# Patient Record
Sex: MALE | Race: WHITE | URBAN - METROPOLITAN AREA
[De-identification: names, ages, dates, MRNs, and addresses within clinical notes are randomized per-mention and may not be internally consistent; named-entity substitution may affect disease eponyms.]

---

## 2020-05-09 ENCOUNTER — EMERGENCY (EMERGENCY)
Facility: HOSPITAL | Age: 75
LOS: 0 days | Discharge: HOME | End: 2020-05-09
Attending: EMERGENCY MEDICINE
Payer: MEDICARE

## 2020-05-09 VITALS
RESPIRATION RATE: 18 BRPM | TEMPERATURE: 99 F | SYSTOLIC BLOOD PRESSURE: 154 MMHG | HEART RATE: 67 BPM | DIASTOLIC BLOOD PRESSURE: 72 MMHG | OXYGEN SATURATION: 97 %

## 2020-05-09 VITALS — WEIGHT: 145.06 LBS | HEIGHT: 66 IN
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
## 2020-05-09 DIAGNOSIS — S92.002A UNSPECIFIED FRACTURE OF LEFT CALCANEUS, INITIAL ENCOUNTER FOR CLOSED FRACTURE: ICD-10-CM

## 2020-05-09 DIAGNOSIS — Z88.5 ALLERGY STATUS TO NARCOTIC AGENT: ICD-10-CM
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
## 2020-05-09 DIAGNOSIS — R60.0 LOCALIZED EDEMA: ICD-10-CM
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
## 2020-05-09 DIAGNOSIS — M25.572 PAIN IN LEFT ANKLE AND JOINTS OF LEFT FOOT: ICD-10-CM

## 2020-05-09 PROCEDURE — 73700 CT LOWER EXTREMITY W/O DYE: CPT | Mod: 26,LT

## 2020-05-09 PROCEDURE — 73630 X-RAY EXAM OF FOOT: CPT | Mod: 26,LT

## 2020-05-09 PROCEDURE — 76376 3D RENDER W/INTRP POSTPROCES: CPT

## 2020-05-09 PROCEDURE — 76376 3D RENDER W/INTRP POSTPROCES: CPT | Mod: 26

## 2020-05-09 PROCEDURE — 73610 X-RAY EXAM OF ANKLE: CPT | Mod: 26,LT

## 2020-05-09 PROCEDURE — 99284 EMERGENCY DEPT VISIT MOD MDM: CPT

## 2020-05-09 PROCEDURE — 73700 CT LOWER EXTREMITY W/O DYE: CPT | Mod: LT

## 2020-05-09 PROCEDURE — 73630 X-RAY EXAM OF FOOT: CPT | Mod: LT

## 2020-05-09 PROCEDURE — 99284 EMERGENCY DEPT VISIT MOD MDM: CPT | Mod: 25

## 2020-05-09 PROCEDURE — 29515 APPLICATION SHORT LEG SPLINT: CPT | Mod: LT

## 2020-05-09 PROCEDURE — 29515 APPLICATION SHORT LEG SPLINT: CPT

## 2020-05-09 PROCEDURE — 73610 X-RAY EXAM OF ANKLE: CPT | Mod: LT

## 2020-05-09 NOTE — ED ADULT NURSE NOTE - NSIMPLEMENTINTERV_GEN_ALL_ED
Implemented All Universal Safety Interventions:  [place] to call system. Call bell, personal items and telephone within reach. Instruct patient to call for assistance. Room bathroom lighting operational. Non-slip footwear when patient is off stretcher. Physically safe environment: no spills, clutter or unnecessary equipment. Stretcher in lowest position, wheels locked, appropriate side rails in place.

## 2020-05-09 NOTE — ED ADULT TRIAGE NOTE - CCCP TRG CHIEF CMPLNT
Patient presents with fish hook in finger about 20 minutes PTA. Bleeding controlled.   
ankle pain/injury

## 2020-05-09 NOTE — ED ADULT NURSE NOTE - OBJECTIVE STATEMENT
Patient present sot ED alert and oriented x3. s/p fall this mourning. Patient was walking down stair and "missed a step". Patient took a fall and dneies hitting head, LOC. Skin is intact. Moderate (2+) swelling to right ankle/foot.  pulses normal 2+. Patient present sot ED alert and oriented x3. s/p fall this mourning. Patient was walking down stair and "missed a step". Patient took a fall and dneies hitting head, LOC. Skin is intact. Moderate (2+) swelling left ankle/foot.  pulses normal 2+.

## 2020-05-09 NOTE — ED PROVIDER NOTE - NS_ ATTENDINGSCRIBEDETAILS _ED_A_ED_FT
I, Geovanny Viveros MD,  performed the initial face to face bedside interview with this patient regarding history of present illness, review of symptoms and relevant past medical, social and family history.  I completed an independent physical examination.    The history, relevant review of systems, past medical and surgical history, medical decision making, and physical examination was documented by the scribe in my presence and I attest to the accuracy of the documentation.

## 2020-05-09 NOTE — ED PROVIDER NOTE - NSFOLLOWUPINSTRUCTIONS_ED_ALL_ED_FT
Sprain    WHAT YOU NEED TO KNOW:    A sprain happens when a ligament is stretched or torn. Ligaments are tough tissues that connect bones. Ligaments support your joints and keep your bones in place. They allow you to lift, lower, or rotate your arms and legs. A sprain may involve one or more ligaments.     DISCHARGE INSTRUCTIONS:    Return to the emergency department if:     You have numbness or tingling below the injury, such as in your fingers or toes.      The skin over your sprained area is blue or pale.       Your pain has increased or returned, even after you take pain medicine.    Contact your healthcare provider if:     Your symptoms do not better.      Your swelling has increased or returned.      Your joint becomes more weak or unstable.      You have questions or concerns about your condition or care.    Medicines:     Prescription pain medicine may be given. Ask how to take this medicine safely.      Acetaminophen decreases pain and fever. It is available without a doctor's order. Ask how much to take and how often to take it. Follow directions. Acetaminophen can cause liver damage if not taken correctly.      NSAIDs, such as ibuprofen, help decrease swelling, pain, and fever. This medicine is available with or without a doctor's order. NSAIDs can cause stomach bleeding or kidney problems in certain people. If you take blood thinner medicine, always ask your healthcare provider if NSAIDs are safe for you. Always read the medicine label and follow directions.      Take your medicine as directed. Contact your healthcare provider if you think your medicine is not helping or if you have side effects. Tell him or her if you are allergic to any medicine. Keep a list of the medicines, vitamins, and herbs you take. Include the amounts, and when and why you take them. Bring the list or the pill bottles to follow-up visits. Carry your medicine list with you in case of an emergency.    Support devices: Support services, such as an elastic bandage, splint, brace, or cast may be needed. These devices limit your movement and protect your joint. You may need to use crutches if the sprain is in your leg. This will help decrease your pain as you move around.     Self-care:     Rest your joint so that it can heal. Return to normal activities as directed.      Apply ice on your injury for 15 to 20 minutes every hour or as directed. Use an ice pack, or put crushed ice in a plastic bag. Cover it with a towel. Ice helps prevent tissue damage and decreases swelling and pain.      Compress the injured area as directed. Ask your healthcare provider if you should wrap an elastic bandage around your injured ligament. An elastic bandage provides support and helps decrease swelling and movement so your joint can heal.       Elevate the injured area above the level of your heart as often as you can. This will help decrease swelling and pain. Prop the injured area on pillows or blankets to keep it elevated comfortably.     Physical therapy: A physical therapist teaches you exercises to help improve movement and strength, and to decrease pain.     Prevent another sprain: Regular exercise can strengthen your muscles and help prevent another injury. Do the following before you begin or return to regular exercise or sports training:     Ask your healthcare provider about the activities you can do. Find out how long your ligament needs to heal. Do not do any physical activity until your healthcare provider says it is okay. If you start activity too soon, you may develop a more serious injury.       Always warm up and stretch before your regular exercise, sport, or physical activity.       Take it slow. Slowly increase how often and how long you exercise or train. Sudden increases in how often you train may cause you to overstretch or tear your ligament.       Use the right equipment. Always wear shoes that fit well and are made for the activity that you are doing. You may also use ankle supports, elbow and knee pads, or braces.     Follow up with your healthcare provider as directed: Write down your questions so you remember to ask them during your visits. Sprain    WHAT YOU NEED TO KNOW:    A sprain happens when a ligament is stretched or torn. Ligaments are tough tissues that connect bones. Ligaments support your joints and keep your bones in place. They allow you to lift, lower, or rotate your arms and legs. A sprain may involve one or more ligaments.     DISCHARGE INSTRUCTIONS:    Return to the emergency department if:     You have numbness or tingling below the injury, such as in your fingers or toes.      The skin over your sprained area is blue or pale.       Your pain has increased or returned, even after you take pain medicine.    Contact your healthcare provider if:     Your symptoms do not better.      Your swelling has increased or returned.      Your joint becomes more weak or unstable.      You have questions or concerns about your condition or care.    Medicines:     Prescription pain medicine may be given. Ask how to take this medicine safely.      Acetaminophen decreases pain and fever. It is available without a doctor's order. Ask how much to take and how often to take it. Follow directions. Acetaminophen can cause liver damage if not taken correctly.      NSAIDs, such as ibuprofen, help decrease swelling, pain, and fever. This medicine is available with or without a doctor's order. NSAIDs can cause stomach bleeding or kidney problems in certain people. If you take blood thinner medicine, always ask your healthcare provider if NSAIDs are safe for you. Always read the medicine label and follow directions.      Take your medicine as directed. Contact your healthcare provider if you think your medicine is not helping or if you have side effects. Tell him or her if you are allergic to any medicine. Keep a list of the medicines, vitamins, and herbs you take. Include the amounts, and when and why you take them. Bring the list or the pill bottles to follow-up visits. Carry your medicine list with you in case of an emergency.    Support devices: Support services, such as an elastic bandage, splint, brace, or cast may be needed. These devices limit your movement and protect your joint. You may need to use crutches if the sprain is in your leg. This will help decrease your pain as you move around.     Self-care:     Rest your joint so that it can heal. Return to normal activities as directed.      Apply ice on your injury for 15 to 20 minutes every hour or as directed. Use an ice pack, or put crushed ice in a plastic bag. Cover it with a towel. Ice helps prevent tissue damage and decreases swelling and pain.      Compress the injured area as directed. Ask your healthcare provider if you should wrap an elastic bandage around your injured ligament. An elastic bandage provides support and helps decrease swelling and movement so your joint can heal.       Elevate the injured area above the level of your heart as often as you can. This will help decrease swelling and pain. Prop the injured area on pillows or blankets to keep it elevated comfortably.     Physical therapy: A physical therapist teaches you exercises to help improve movement and strength, and to decrease pain.     Prevent another sprain: Regular exercise can strengthen your muscles and help prevent another injury. Do the following before you begin or return to regular exercise or sports training:     Ask your healthcare provider about the activities you can do. Find out how long your ligament needs to heal. Do not do any physical activity until your healthcare provider says it is okay. If you start activity too soon, you may develop a more serious injury.       Always warm up and stretch before your regular exercise, sport, or physical activity.       Take it slow. Slowly increase how often and how long you exercise or train. Sudden increases in how often you train may cause you to overstretch or tear your ligament.       Use the right equipment. Always wear shoes that fit well and are made for the activity that you are doing. You may also use ankle supports, elbow and knee pads, or braces.     Follow up with your healthcare provider as directed: Write down your questions so you remember to ask them during your visits.  Fracture    A fracture is a break in one of your bones. This can occur from a variety of injuries, especially traumatic ones. Symptoms include pain, bruising, or swelling. Do not use the injured limb. If a fracture is in one of the bones below your waist, do not put weight on that limb unless instructed to do so by your healthcare provider. Crutches or a cane may have been provided. A splint or cast may have been applied by your health care provider. Make sure to keep it dry and follow up with an orthopedist as instructed.    SEEK IMMEDIATE MEDICAL CARE IF YOU HAVE ANY OF THE FOLLOWING SYMPTOMS: numbness, tingling, increasing pain, or weakness in any part of the injured limb. Please follow up with Dr. Rogelio Quiles Podiatry who supervised the physicians at [phone] that saw and examined you in the ED. Please return to us immediately if any worsening of the symptoms if any health concerns including if any emergencies. Please avoid exertion (putting pressure on your legs). If any issues with appointment, if worsening of pain, or if any tingling in the toes, change of color in toes or if any other health concerns please return to us immediately.

## 2020-05-09 NOTE — ED PROVIDER NOTE - PATIENT PORTAL LINK FT
You can access the FollowMyHealth Patient Portal offered by [facility] by registering at the following website: [email or web address]. By joining [employer]’s FollowMyHealth portal, you will also be able to view your health information using other applications (apps) compatible with our system. You can access the FollowMyHealth Patient Portal offered by [facility] by registering at the following website: [email or web address]. By joining [employer]’s FollowMyHealth portal, you will also be able to view your health information using other applications (apps) compatible with our system. You can access the FollowMyHealth Patient Portal offered by [facility] by registering at the following website: [email or web address]. By joining [employer]’s FollowMyHealth portal, you will also be able to view your health information using other applications (apps) compatible with our system.

## 2020-05-09 NOTE — ED ADULT TRIAGE NOTE - CHIEF COMPLAINT QUOTE
pt c/o left ankle pain s/p fall this morning around 10 AM, pt states missed a step, fell down 8 steps onto the concrete floor, denies any head injuries, not on blood thinners.  pt unable to bear wt on left ankle.  +swelling noted, no open wound noted.  denies numbness, tingling sensation.

## 2020-05-09 NOTE — ED PROVIDER NOTE - PROGRESS NOTE DETAILS
Pending CT read and podiatry disposition given calcaneal abnormalities seen on XR. Pt updated. Pending podiatry disposition given calcaneal abnormalities seen on XR. Pt updated. Was updated by podiatry that patient is OK to go home. Will need crutches and outpatient follow up with Dr. Quiles. Patient is aware and willing. Will return if any worsening of symptoms.

## 2020-05-09 NOTE — ED PROVIDER NOTE - PROVIDER TOKENS
PROVIDER:[TOKEN:[[id number]]] PROVIDER:[TOKEN:[[id number]]],PROVIDER:[TOKEN:[[id number]]] PROVIDER:[TOKEN:[[id number]]]

## 2020-05-09 NOTE — ED ADULT NURSE REASSESSMENT NOTE - NS ED NURSE REASSESS COMMENT FT1
patient ambulatory with crutches, ankle/foot splinted by podiatrist. cap refill <2 seconds. denies pain or discomfort. written and verbal discharge and followup instructions given to patient, patient verbalized back understanding.

## 2020-05-09 NOTE — ED PROVIDER NOTE - CARE PROVIDER_API CALL
Juan Fowler  Podiatric Medicine and Surgery  [address] [place] [address]  Phone: [phone]  Fax: [phone]  Follow Up Time: Juan Fowler  Podiatric Medicine and Surgery  [address] [place] [address]  Phone: [phone]  Fax: [phone]  Follow Up Time:     Juanito Jaquez (DO)  Orthopaedic Surgery  [address] [place] [address]  Phone: [phone]  Fax: [phone]  Follow Up Time: Juanito Jaquez (DO)  Orthopaedic Surgery  [address] [place] [address]  Phone: [phone]  Fax: [phone]  Follow Up Time: Juanito Jaquez (DO)  Orthopaedic Surgery  [address] [place] [address]  Phone: [phone]  Fax: [phone]  Follow Up Time:

## 2020-05-09 NOTE — ED PROVIDER NOTE - CARE PLAN
Principal Discharge DX:	Foot pain, left Principal Discharge DX:	Closed fracture of left foot, initial encounter

## 2020-05-09 NOTE — ED PROVIDER NOTE - OBJECTIVE STATEMENT
75 y/o male with no PMHx presents to the ED c/o +left ankle pain. Reports he fell down steps in his backyard around 10 AM this morning.  Felt a pop in his left ankle/anterior foot. No head injury or LOC. Has been icing his ankle and took 600 mg Motrin. Endorses left foot +edema and +ecchymosis as well. No fever.

## 2020-11-05 PROBLEM — Z00.00 ENCOUNTER FOR PREVENTIVE HEALTH EXAMINATION: Status: ACTIVE | Noted: 2020-11-05

## 2021-04-08 ENCOUNTER — APPOINTMENT (OUTPATIENT)
Dept: INTERNAL MEDICINE | Facility: CLINIC | Age: 76
End: 2021-04-08
Payer: MEDICARE

## 2021-04-08 VITALS
SYSTOLIC BLOOD PRESSURE: 150 MMHG | DIASTOLIC BLOOD PRESSURE: 90 MMHG | BODY MASS INDEX: 21.86 KG/M2 | HEIGHT: 66 IN | WEIGHT: 136 LBS

## 2021-04-08 VITALS — SYSTOLIC BLOOD PRESSURE: 136 MMHG | DIASTOLIC BLOOD PRESSURE: 84 MMHG

## 2021-04-08 PROBLEM — Z78.9 OTHER SPECIFIED HEALTH STATUS: Chronic | Status: ACTIVE | Noted: 2020-05-09

## 2021-04-08 PROCEDURE — 99213 OFFICE O/P EST LOW 20 MIN: CPT

## 2021-04-08 NOTE — PHYSICAL EXAM
[Normal] : no acute distress, well nourished, well developed and well-appearing [No CVA Tenderness] : no CVA  tenderness [No Spinal Tenderness] : no spinal tenderness [No Joint Swelling] : no joint swelling [Grossly Normal Strength/Tone] : grossly normal strength/tone [No Focal Deficits] : no focal deficits [de-identified] : Range of motion is good.  No palpable tenderness.

## 2021-04-08 NOTE — REVIEW OF SYSTEMS
[Fever] : no fever [Chills] : no chills [Joint Stiffness] : joint stiffness [Muscle Pain] : no muscle pain [Muscle Weakness] : no muscle weakness [Back Pain] : back pain [Joint Swelling] : no joint swelling [Unsteady Walk] : no ataxia [de-identified] : No leg numbness or weakness

## 2021-04-08 NOTE — HISTORY OF PRESENT ILLNESS
[de-identified] : 76 y/o male presents complaining of some persistent right lower back pain over the past 2 months.  He states it started mostly after he had a foot and heel fracture in May 2020 which seemed to affect his gait and put pressure on the right side of his back.  The pain is located mostly in the back and to the upper hip area but does not radiate down the leg.  He has been to a chiropractor numerous times without significant relief.  He was asking about possible physical therapy.

## 2021-04-08 NOTE — PLAN
[FreeTextEntry1] : Lumbar strain–his exam is not consistent with radiculopathy and this seems more like local muscle strain and possible spasm.  He has used Motrin 600 on occasion and he can continue to do this.  He was advised to use moist heat and is also given a referral for a physical therapy evaluation and treatment.  He was told if he has no improvement or there is any worsening to follow-up.

## 2021-05-24 ENCOUNTER — RX RENEWAL (OUTPATIENT)
Age: 76
End: 2021-05-24

## 2021-06-21 ENCOUNTER — APPOINTMENT (OUTPATIENT)
Dept: INTERNAL MEDICINE | Facility: CLINIC | Age: 76
End: 2021-06-21
Payer: MEDICARE

## 2021-06-21 VITALS
SYSTOLIC BLOOD PRESSURE: 130 MMHG | DIASTOLIC BLOOD PRESSURE: 72 MMHG | HEIGHT: 66 IN | BODY MASS INDEX: 22.98 KG/M2 | WEIGHT: 143 LBS

## 2021-06-21 DIAGNOSIS — S39.012A STRAIN OF MUSCLE, FASCIA AND TENDON OF LOWER BACK, INITIAL ENCOUNTER: ICD-10-CM

## 2021-06-21 DIAGNOSIS — R73.09 OTHER ABNORMAL GLUCOSE: ICD-10-CM

## 2021-06-21 PROCEDURE — 99214 OFFICE O/P EST MOD 30 MIN: CPT | Mod: 25

## 2021-06-21 PROCEDURE — 36415 COLL VENOUS BLD VENIPUNCTURE: CPT

## 2021-06-21 NOTE — REVIEW OF SYSTEMS
[Back Pain] : back pain [Insomnia] : insomnia [Fever] : no fever [Chills] : no chills [Chest Pain] : no chest pain [Shortness Of Breath] : no shortness of breath [Muscle Weakness] : no muscle weakness [Joint Swelling] : no joint swelling [Headache] : no headache [Dizziness] : no dizziness

## 2021-06-21 NOTE — ASSESSMENT
[FreeTextEntry1] : Hyperlipidemia–follow-up fasting labs are sent.  His cholesterol reading from 9/20 was minimally elevated at 206 but his LDL was only 116.\par \par BPH–he remains relatively asymptomatic.  Follow-up PSA was sent and his last reading was 0.85\par \par Lumbar strain/history of lumbar fusion–he was told his symptoms seem to more consistent with low back mechanical issues as opposed to disc related disease.  We discussed him following up with a spine specialist for further evaluation and he will most likely proceed with that.

## 2021-06-21 NOTE — HISTORY OF PRESENT ILLNESS
[de-identified] : 74 y/o presents for follow up of ongoing low back pain and for fasting bloods.  He has been going to physical therapy and he has had some improvement.  He is asking about seeing a spine specialist for further evaluation.  His pain is located mostly in the right lower back area and it does not extend down into his leg.  He has a history of a lumbar fusion in 2010 at that time he had typical sciatica symptoms.

## 2021-06-21 NOTE — PHYSICAL EXAM
[No Acute Distress] : no acute distress [No JVD] : no jugular venous distention [Clear to Auscultation] : lungs were clear to auscultation bilaterally [Normal Rate] : normal rate  [Regular Rhythm] : with a regular rhythm [No Spinal Tenderness] : no spinal tenderness [No Joint Swelling] : no joint swelling [No Focal Deficits] : no focal deficits

## 2021-06-22 LAB
ALBUMIN SERPL ELPH-MCNC: 4.3 G/DL
ALP BLD-CCNC: 82 U/L
ALT SERPL-CCNC: 14 U/L
ANION GAP SERPL CALC-SCNC: 12 MMOL/L
AST SERPL-CCNC: 20 U/L
BASOPHILS # BLD AUTO: 0.05 K/UL
BASOPHILS NFR BLD AUTO: 0.8 %
BILIRUB SERPL-MCNC: 0.6 MG/DL
BUN SERPL-MCNC: 15 MG/DL
CALCIUM SERPL-MCNC: 9.2 MG/DL
CHLORIDE SERPL-SCNC: 106 MMOL/L
CHOLEST SERPL-MCNC: 202 MG/DL
CO2 SERPL-SCNC: 24 MMOL/L
CREAT SERPL-MCNC: 0.92 MG/DL
EOSINOPHIL # BLD AUTO: 0.22 K/UL
EOSINOPHIL NFR BLD AUTO: 3.7 %
ESTIMATED AVERAGE GLUCOSE: 114 MG/DL
GLUCOSE SERPL-MCNC: 95 MG/DL
HBA1C MFR BLD HPLC: 5.6 %
HCT VFR BLD CALC: 41.4 %
HDLC SERPL-MCNC: 60 MG/DL
HGB BLD-MCNC: 13.8 G/DL
IMM GRANULOCYTES NFR BLD AUTO: 0.3 %
LDLC SERPL CALC-MCNC: 124 MG/DL
LYMPHOCYTES # BLD AUTO: 1.37 K/UL
LYMPHOCYTES NFR BLD AUTO: 23.2 %
MAN DIFF?: NORMAL
MCHC RBC-ENTMCNC: 30.5 PG
MCHC RBC-ENTMCNC: 33.3 GM/DL
MCV RBC AUTO: 91.4 FL
MONOCYTES # BLD AUTO: 0.45 K/UL
MONOCYTES NFR BLD AUTO: 7.6 %
NEUTROPHILS # BLD AUTO: 3.8 K/UL
NEUTROPHILS NFR BLD AUTO: 64.4 %
NONHDLC SERPL-MCNC: 142 MG/DL
PLATELET # BLD AUTO: 207 K/UL
POTASSIUM SERPL-SCNC: 4.2 MMOL/L
PROT SERPL-MCNC: 6.5 G/DL
PSA SERPL-MCNC: 0.74 NG/ML
RBC # BLD: 4.53 M/UL
RBC # FLD: 13.5 %
SODIUM SERPL-SCNC: 142 MMOL/L
TRIGL SERPL-MCNC: 90 MG/DL
WBC # FLD AUTO: 5.91 K/UL

## 2021-11-16 ENCOUNTER — RX RENEWAL (OUTPATIENT)
Age: 76
End: 2021-11-16

## 2021-11-26 ENCOUNTER — RX RENEWAL (OUTPATIENT)
Age: 76
End: 2021-11-26

## 2022-05-10 ENCOUNTER — APPOINTMENT (OUTPATIENT)
Dept: INTERNAL MEDICINE | Facility: CLINIC | Age: 77
End: 2022-05-10
Payer: MEDICARE

## 2022-05-10 ENCOUNTER — NON-APPOINTMENT (OUTPATIENT)
Age: 77
End: 2022-05-10

## 2022-05-10 VITALS — SYSTOLIC BLOOD PRESSURE: 130 MMHG | DIASTOLIC BLOOD PRESSURE: 74 MMHG

## 2022-05-10 DIAGNOSIS — H10.9 UNSPECIFIED CONJUNCTIVITIS: ICD-10-CM

## 2022-05-10 PROCEDURE — 99213 OFFICE O/P EST LOW 20 MIN: CPT

## 2022-05-10 RX ORDER — NEOMYCIN SULFATE, POLYMYXIN B SULFATE AND DEXAMETHASONE 3.5; 10000; 1 MG/ML; [USP'U]/ML; MG/ML
3.5-10000-0.1 SUSPENSION OPHTHALMIC 3 TIMES DAILY
Qty: 1 | Refills: 0 | Status: ACTIVE | COMMUNITY
Start: 2022-05-10 | End: 1900-01-01

## 2022-05-10 NOTE — PHYSICAL EXAM
[No Acute Distress] : no acute distress [No Lymphadenopathy] : no lymphadenopathy [Clear to Auscultation] : lungs were clear to auscultation bilaterally [Normal Rate] : normal rate  [Regular Rhythm] : with a regular rhythm [de-identified] : Right conjunctiva and sclera are injected

## 2022-05-10 NOTE — HISTORY OF PRESENT ILLNESS
[FreeTextEntry8] : 76-year-old presents complaining of sinus congestion, right eye redness/discharge, and post-nasal drip that has been worsening over the past few days.  It started about 2 weeks ago when he had a was seen like a typical cold slowly improved but never completely and is now worsened.  He denies any fever or chills, chest pain or shortness of breath.

## 2022-05-10 NOTE — ASSESSMENT
[FreeTextEntry1] : Sinusitis–his symptoms have been essentially progressive over the past 2 weeks.\par Is given Omnicef 3 mg twice daily for the next 10 days.\par \par Conjunctivitis–he is also given Maxitrol ophthalmic solution and will use 1 drop 3 or 4 times daily for the next few days.

## 2022-05-10 NOTE — REVIEW OF SYSTEMS
[Fever] : no fever [Chills] : no chills [Redness] : redness [Vision Problems] : no vision problems [Nasal Discharge] : nasal discharge [Chest Pain] : no chest pain [Shortness Of Breath] : no shortness of breath

## 2022-05-11 ENCOUNTER — RX RENEWAL (OUTPATIENT)
Age: 77
End: 2022-05-11

## 2022-05-12 RX ORDER — CEFDINIR 300 MG/1
300 CAPSULE ORAL
Qty: 20 | Refills: 0 | Status: DISCONTINUED | COMMUNITY
Start: 2022-05-10 | End: 2022-05-12

## 2022-09-15 ENCOUNTER — APPOINTMENT (OUTPATIENT)
Dept: INTERNAL MEDICINE | Facility: CLINIC | Age: 77
End: 2022-09-15

## 2022-09-15 ENCOUNTER — NON-APPOINTMENT (OUTPATIENT)
Age: 77
End: 2022-09-15

## 2022-09-15 VITALS
BODY MASS INDEX: 22.18 KG/M2 | HEIGHT: 66 IN | WEIGHT: 138 LBS | DIASTOLIC BLOOD PRESSURE: 80 MMHG | SYSTOLIC BLOOD PRESSURE: 124 MMHG

## 2022-09-15 DIAGNOSIS — G47.00 INSOMNIA, UNSPECIFIED: ICD-10-CM

## 2022-09-15 PROCEDURE — G0439: CPT

## 2022-09-15 PROCEDURE — 36415 COLL VENOUS BLD VENIPUNCTURE: CPT | Mod: 59

## 2022-09-15 NOTE — ASSESSMENT
[FreeTextEntry1] : His exam is unremarkable.\par \par Mild hyperlipidemia–follow-up lipid profile was sent.  He is not presently on medication.\par \par History of BPH–he remains asymptomatic and has been using finasteride 5 mg mostly on an every other day basis.\par Follow-up PSA was sent.\par \par History of anxiety/insomnia–continues to use trazodone 50 mg on an as-needed basis and has done well.\par \par Last colonoscopy was in 2017 and that was a normal exam.

## 2022-09-15 NOTE — REVIEW OF SYSTEMS
[Fever] : no fever [Chest Pain] : no chest pain [Palpitations] : no palpitations [Shortness Of Breath] : no shortness of breath [Dyspnea on Exertion] : not dyspnea on exertion [Abdominal Pain] : no abdominal pain [Diarrhea] : no diarrhea [Muscle Weakness] : no muscle weakness [Headache] : no headache [Dizziness] : no dizziness [Insomnia] : no insomnia [Anxiety] : no anxiety

## 2022-09-15 NOTE — PHYSICAL EXAM
[No Acute Distress] : no acute distress [No JVD] : no jugular venous distention [No Lymphadenopathy] : no lymphadenopathy [No Respiratory Distress] : no respiratory distress  [Clear to Auscultation] : lungs were clear to auscultation bilaterally [Normal] : normal rate, regular rhythm, normal S1 and S2 and no murmur heard [No Carotid Bruits] : no carotid bruits [No Edema] : there was no peripheral edema [Non Tender] : non-tender [Grossly Normal Strength/Tone] : grossly normal strength/tone [No Focal Deficits] : no focal deficits [Alert and Oriented x3] : oriented to person, place, and time

## 2022-09-15 NOTE — HISTORY OF PRESENT ILLNESS
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

## 2022-09-15 NOTE — HEALTH RISK ASSESSMENT
[Never] : Never [No] : In the past 12 months have you used drugs other than those required for medical reasons? No [No falls in past year] : Patient reported no falls in the past year [0] : 2) Feeling down, depressed, or hopeless: Not at all (0) [PHQ-2 Negative - No further assessment needed] : PHQ-2 Negative - No further assessment needed [Patient reported colonoscopy was normal] : Patient reported colonoscopy was normal [[id number]] : 0 [ColonoscopyDate] : 08/17

## 2022-09-16 LAB
ALBUMIN SERPL ELPH-MCNC: 4.3 G/DL
ALP BLD-CCNC: 81 U/L
ALT SERPL-CCNC: 13 U/L
ANION GAP SERPL CALC-SCNC: 9 MMOL/L
AST SERPL-CCNC: 16 U/L
BASOPHILS # BLD AUTO: 0.05 K/UL
BASOPHILS NFR BLD AUTO: 1 %
BILIRUB SERPL-MCNC: 0.3 MG/DL
BUN SERPL-MCNC: 16 MG/DL
CALCIUM SERPL-MCNC: 9.2 MG/DL
CHLORIDE SERPL-SCNC: 104 MMOL/L
CHOLEST SERPL-MCNC: 221 MG/DL
CO2 SERPL-SCNC: 28 MMOL/L
CREAT SERPL-MCNC: 0.94 MG/DL
EGFR: 82 ML/MIN/1.73M2
EOSINOPHIL # BLD AUTO: 0.23 K/UL
EOSINOPHIL NFR BLD AUTO: 4.5 %
GLUCOSE SERPL-MCNC: 93 MG/DL
HCT VFR BLD CALC: 43.3 %
HDLC SERPL-MCNC: 70 MG/DL
HGB BLD-MCNC: 14.1 G/DL
IMM GRANULOCYTES NFR BLD AUTO: 0.4 %
LDLC SERPL CALC-MCNC: 137 MG/DL
LYMPHOCYTES # BLD AUTO: 1.67 K/UL
LYMPHOCYTES NFR BLD AUTO: 32.4 %
MAN DIFF?: NORMAL
MCHC RBC-ENTMCNC: 30.9 PG
MCHC RBC-ENTMCNC: 32.6 GM/DL
MCV RBC AUTO: 95 FL
MONOCYTES # BLD AUTO: 0.48 K/UL
MONOCYTES NFR BLD AUTO: 9.3 %
NEUTROPHILS # BLD AUTO: 2.7 K/UL
NEUTROPHILS NFR BLD AUTO: 52.4 %
NONHDLC SERPL-MCNC: 152 MG/DL
PLATELET # BLD AUTO: 211 K/UL
POTASSIUM SERPL-SCNC: 4.5 MMOL/L
PROT SERPL-MCNC: 6.5 G/DL
PSA SERPL-MCNC: 1.08 NG/ML
RBC # BLD: 4.56 M/UL
RBC # FLD: 14.1 %
SODIUM SERPL-SCNC: 141 MMOL/L
TRIGL SERPL-MCNC: 73 MG/DL
WBC # FLD AUTO: 5.15 K/UL

## 2022-11-07 RX ORDER — FINASTERIDE 5 MG/1
5 TABLET, FILM COATED ORAL
Qty: 90 | Refills: 1 | Status: ACTIVE | COMMUNITY
Start: 2020-11-05 | End: 1900-01-01

## 2022-11-14 ENCOUNTER — APPOINTMENT (OUTPATIENT)
Dept: INTERNAL MEDICINE | Facility: CLINIC | Age: 77
End: 2022-11-14

## 2022-11-14 VITALS — SYSTOLIC BLOOD PRESSURE: 136 MMHG | DIASTOLIC BLOOD PRESSURE: 70 MMHG

## 2022-11-14 VITALS — TEMPERATURE: 98.7 F

## 2022-11-14 DIAGNOSIS — J32.9 CHRONIC SINUSITIS, UNSPECIFIED: ICD-10-CM

## 2022-11-14 PROCEDURE — 99213 OFFICE O/P EST LOW 20 MIN: CPT

## 2022-11-14 RX ORDER — AZITHROMYCIN 250 MG/1
250 TABLET, FILM COATED ORAL
Qty: 1 | Refills: 0 | Status: DISCONTINUED | COMMUNITY
Start: 2022-05-12 | End: 2022-11-14

## 2022-11-14 NOTE — ASSESSMENT
[FreeTextEntry1] : Sinusitis–symptoms have been persistent along with discolored mucus.\par He will start Zithromax for 5 days along with continued increase fluids as needed.

## 2022-11-14 NOTE — HISTORY OF PRESENT ILLNESS
[FreeTextEntry8] : 76 y/o male presents with persistent and increasing sinus/head congestion with postnasal drip and cough over the past 7-10 days.  He denies any fever or chills, chest pain or shortness of breath.  He does have discolored looking mucus/sputum.

## 2022-11-14 NOTE — REVIEW OF SYSTEMS
[Postnasal Drip] : postnasal drip [Cough] : cough [Fever] : no fever [Chest Pain] : no chest pain [Shortness Of Breath] : no shortness of breath

## 2023-05-12 ENCOUNTER — RX RENEWAL (OUTPATIENT)
Age: 78
End: 2023-05-12

## 2023-10-26 ENCOUNTER — RX RENEWAL (OUTPATIENT)
Age: 78
End: 2023-10-26

## 2023-11-22 ENCOUNTER — NON-APPOINTMENT (OUTPATIENT)
Age: 78
End: 2023-11-22

## 2023-11-22 ENCOUNTER — APPOINTMENT (OUTPATIENT)
Dept: INTERNAL MEDICINE | Facility: CLINIC | Age: 78
End: 2023-11-22
Payer: MEDICARE

## 2023-11-22 VITALS
HEIGHT: 66 IN | SYSTOLIC BLOOD PRESSURE: 128 MMHG | BODY MASS INDEX: 21.86 KG/M2 | DIASTOLIC BLOOD PRESSURE: 78 MMHG | WEIGHT: 136 LBS

## 2023-11-22 DIAGNOSIS — N40.0 BENIGN PROSTATIC HYPERPLASIA WITHOUT LOWER URINARY TRACT SYMPMS: ICD-10-CM

## 2023-11-22 DIAGNOSIS — Z00.00 ENCOUNTER FOR GENERAL ADULT MEDICAL EXAMINATION W/OUT ABNORMAL FINDINGS: ICD-10-CM

## 2023-11-22 DIAGNOSIS — F41.9 ANXIETY DISORDER, UNSPECIFIED: ICD-10-CM

## 2023-11-22 DIAGNOSIS — E78.5 HYPERLIPIDEMIA, UNSPECIFIED: ICD-10-CM

## 2023-11-22 DIAGNOSIS — Z78.9 OTHER SPECIFIED HEALTH STATUS: ICD-10-CM

## 2023-11-22 PROCEDURE — G0439: CPT

## 2023-11-22 PROCEDURE — 36415 COLL VENOUS BLD VENIPUNCTURE: CPT

## 2023-11-23 LAB
ALBUMIN SERPL ELPH-MCNC: 4.5 G/DL
ALP BLD-CCNC: 80 U/L
ALT SERPL-CCNC: 17 U/L
ANION GAP SERPL CALC-SCNC: 12 MMOL/L
AST SERPL-CCNC: 20 U/L
BASOPHILS # BLD AUTO: 0.04 K/UL
BASOPHILS NFR BLD AUTO: 0.8 %
BILIRUB SERPL-MCNC: 0.6 MG/DL
BUN SERPL-MCNC: 13 MG/DL
CALCIUM SERPL-MCNC: 9.7 MG/DL
CHLORIDE SERPL-SCNC: 101 MMOL/L
CHOLEST SERPL-MCNC: 213 MG/DL
CO2 SERPL-SCNC: 25 MMOL/L
CREAT SERPL-MCNC: 0.9 MG/DL
EGFR: 86 ML/MIN/1.73M2
EOSINOPHIL # BLD AUTO: 0.22 K/UL
EOSINOPHIL NFR BLD AUTO: 4.1 %
GLUCOSE SERPL-MCNC: 97 MG/DL
HCT VFR BLD CALC: 44.8 %
HDLC SERPL-MCNC: 70 MG/DL
HGB BLD-MCNC: 14.8 G/DL
IMM GRANULOCYTES NFR BLD AUTO: 0.2 %
LDLC SERPL CALC-MCNC: 126 MG/DL
LYMPHOCYTES # BLD AUTO: 1.49 K/UL
LYMPHOCYTES NFR BLD AUTO: 28 %
MAN DIFF?: NORMAL
MCHC RBC-ENTMCNC: 30.5 PG
MCHC RBC-ENTMCNC: 33 GM/DL
MCV RBC AUTO: 92.2 FL
MONOCYTES # BLD AUTO: 0.51 K/UL
MONOCYTES NFR BLD AUTO: 9.6 %
NEUTROPHILS # BLD AUTO: 3.06 K/UL
NEUTROPHILS NFR BLD AUTO: 57.3 %
NONHDLC SERPL-MCNC: 143 MG/DL
PLATELET # BLD AUTO: 212 K/UL
POTASSIUM SERPL-SCNC: 5.1 MMOL/L
PROT SERPL-MCNC: 7 G/DL
PSA SERPL-MCNC: 0.97 NG/ML
RBC # BLD: 4.86 M/UL
RBC # FLD: 14 %
SODIUM SERPL-SCNC: 139 MMOL/L
TRIGL SERPL-MCNC: 98 MG/DL
WBC # FLD AUTO: 5.33 K/UL

## 2023-11-27 RX ORDER — TRAZODONE HYDROCHLORIDE 50 MG/1
50 TABLET ORAL
Qty: 90 | Refills: 3 | Status: ACTIVE | COMMUNITY
Start: 2020-11-05 | End: 1900-01-01

## 2024-04-16 RX ORDER — AZITHROMYCIN 250 MG/1
250 TABLET, FILM COATED ORAL
Qty: 1 | Refills: 0 | Status: ACTIVE | COMMUNITY
Start: 2022-11-14 | End: 1900-01-01

## 2024-07-31 ENCOUNTER — APPOINTMENT (OUTPATIENT)
Dept: INTERNAL MEDICINE | Facility: CLINIC | Age: 79
End: 2024-07-31
Payer: MEDICARE

## 2024-07-31 VITALS
HEIGHT: 66 IN | WEIGHT: 136 LBS | SYSTOLIC BLOOD PRESSURE: 128 MMHG | DIASTOLIC BLOOD PRESSURE: 64 MMHG | BODY MASS INDEX: 21.86 KG/M2

## 2024-07-31 DIAGNOSIS — N40.0 BENIGN PROSTATIC HYPERPLASIA WITHOUT LOWER URINARY TRACT SYMPMS: ICD-10-CM

## 2024-07-31 DIAGNOSIS — G47.00 INSOMNIA, UNSPECIFIED: ICD-10-CM

## 2024-07-31 DIAGNOSIS — E78.5 HYPERLIPIDEMIA, UNSPECIFIED: ICD-10-CM

## 2024-07-31 DIAGNOSIS — M25.50 PAIN IN UNSPECIFIED JOINT: ICD-10-CM

## 2024-07-31 PROCEDURE — G2211 COMPLEX E/M VISIT ADD ON: CPT

## 2024-07-31 PROCEDURE — 36415 COLL VENOUS BLD VENIPUNCTURE: CPT

## 2024-07-31 PROCEDURE — 99214 OFFICE O/P EST MOD 30 MIN: CPT

## 2024-07-31 NOTE — ASSESSMENT
[FreeTextEntry1] : His exam is unremarkable/unchanged.  Recent arthralgia/myalgia-labs including ESR/CRP and Lyme titers sent.  Hyperlipidemia-a repeat lipid profile was sent.  He is not present medication.  Did discuss the possibility of starting statin therapy if it continues to elevate.  History of BPH-he is asymptomatic and remains on finasteride 5 mg mostly on an every other day basis. A follow-up PSA was sent.  History of anxiety/insomnia-has been doing well and continues to use trazodone 50 mg needed on as-needed basis.  Last colonoscopy was in 2017 and that was a normal exam.

## 2024-07-31 NOTE — HEALTH RISK ASSESSMENT
[No] : In the past 12 months have you used drugs other than those required for medical reasons? No [No falls in past year] : Patient reported no falls in the past year [0] : 2) Feeling down, depressed, or hopeless: Not at all (0) [PHQ-2 Negative - No further assessment needed] : PHQ-2 Negative - No further assessment needed [Never] : Never [[id number]] : 0

## 2024-07-31 NOTE — REVIEW OF SYSTEMS
[Fatigue] : fatigue [Joint Pain] : joint pain [Joint Stiffness] : joint stiffness [Fever] : no fever [Chills] : no chills [Chest Pain] : no chest pain [Shortness Of Breath] : no shortness of breath [Abdominal Pain] : no abdominal pain

## 2024-07-31 NOTE — PHYSICAL EXAM
[No Acute Distress] : no acute distress [No JVD] : no jugular venous distention [Clear to Auscultation] : lungs were clear to auscultation bilaterally [Regular Rhythm] : with a regular rhythm [Normal S1, S2] : normal S1 and S2 [No Murmur] : no murmur heard [No Edema] : there was no peripheral edema [No Joint Swelling] : no joint swelling [No Focal Deficits] : no focal deficits [Alert and Oriented x3] : oriented to person, place, and time

## 2024-08-01 LAB
ALBUMIN SERPL ELPH-MCNC: 4.2 G/DL
ALP BLD-CCNC: 84 U/L
ALT SERPL-CCNC: 20 U/L
ANION GAP SERPL CALC-SCNC: 14 MMOL/L
AST SERPL-CCNC: 21 U/L
BASOPHILS # BLD AUTO: 0.04 K/UL
BASOPHILS NFR BLD AUTO: 0.8 %
BILIRUB SERPL-MCNC: 0.5 MG/DL
BUN SERPL-MCNC: 21 MG/DL
CALCIUM SERPL-MCNC: 9.3 MG/DL
CHLORIDE SERPL-SCNC: 104 MMOL/L
CHOLEST SERPL-MCNC: 181 MG/DL
CO2 SERPL-SCNC: 23 MMOL/L
CREAT SERPL-MCNC: 0.97 MG/DL
CRP SERPL-MCNC: 8 MG/L
EGFR: 78 ML/MIN/1.73M2
EOSINOPHIL # BLD AUTO: 0.2 K/UL
EOSINOPHIL NFR BLD AUTO: 3.9 %
ERYTHROCYTE [SEDIMENTATION RATE] IN BLOOD BY WESTERGREN METHOD: 19 MM/HR
GLUCOSE SERPL-MCNC: 99 MG/DL
HCT VFR BLD CALC: 37.7 %
HDLC SERPL-MCNC: 49 MG/DL
HGB BLD-MCNC: 12.1 G/DL
IMM GRANULOCYTES NFR BLD AUTO: 0.6 %
LDLC SERPL CALC-MCNC: 117 MG/DL
LYMPHOCYTES # BLD AUTO: 1.12 K/UL
LYMPHOCYTES NFR BLD AUTO: 21.7 %
MAN DIFF?: NORMAL
MCHC RBC-ENTMCNC: 31 PG
MCHC RBC-ENTMCNC: 32.1 GM/DL
MCV RBC AUTO: 96.7 FL
MONOCYTES # BLD AUTO: 0.5 K/UL
MONOCYTES NFR BLD AUTO: 9.7 %
NEUTROPHILS # BLD AUTO: 3.26 K/UL
NEUTROPHILS NFR BLD AUTO: 63.3 %
NONHDLC SERPL-MCNC: 133 MG/DL
PLATELET # BLD AUTO: 238 K/UL
POTASSIUM SERPL-SCNC: 5.2 MMOL/L
PROT SERPL-MCNC: 7.2 G/DL
PSA SERPL-MCNC: 0.74 NG/ML
RBC # BLD: 3.9 M/UL
RBC # FLD: 14.3 %
SODIUM SERPL-SCNC: 141 MMOL/L
TRIGL SERPL-MCNC: 85 MG/DL
TSH SERPL-ACNC: 1.36 UIU/ML
WBC # FLD AUTO: 5.15 K/UL

## 2024-08-02 LAB
B BURGDOR AB SER-IMP: NEGATIVE
B BURGDOR IGG+IGM SER QL: 0.3 INDEX

## 2024-09-04 ENCOUNTER — APPOINTMENT (OUTPATIENT)
Dept: INTERNAL MEDICINE | Facility: CLINIC | Age: 79
End: 2024-09-04
Payer: MEDICARE

## 2024-09-04 VITALS
HEIGHT: 66 IN | SYSTOLIC BLOOD PRESSURE: 128 MMHG | DIASTOLIC BLOOD PRESSURE: 74 MMHG | BODY MASS INDEX: 22.34 KG/M2 | WEIGHT: 139 LBS

## 2024-09-04 VITALS — SYSTOLIC BLOOD PRESSURE: 128 MMHG | DIASTOLIC BLOOD PRESSURE: 74 MMHG

## 2024-09-04 DIAGNOSIS — D64.9 ANEMIA, UNSPECIFIED: ICD-10-CM

## 2024-09-04 PROCEDURE — 36415 COLL VENOUS BLD VENIPUNCTURE: CPT

## 2024-09-04 PROCEDURE — 99213 OFFICE O/P EST LOW 20 MIN: CPT

## 2024-09-04 PROCEDURE — G2211 COMPLEX E/M VISIT ADD ON: CPT

## 2024-09-04 NOTE — REVIEW OF SYSTEMS
[Fever] : no fever [Fatigue] : no fatigue [Abdominal Pain] : no abdominal pain [Nausea] : no nausea [Vomiting] : no vomiting [Dizziness] : no dizziness

## 2024-09-04 NOTE — ASSESSMENT
[FreeTextEntry1] : [id number] CBC was sent. Did have a colonoscopy in 2017 that was unremarkable.  We did discuss options if the blood count does remain low. He is asymptomatic.  Does remain very active and exercises on a regular basis without difficulty.

## 2024-09-04 NOTE — HISTORY OF PRESENT ILLNESS
[de-identified] : Presents for follow-up and repeat lab work. At his last visit on 7/31 his hemoglobin had dropped to 12.1 when previously they were in the 14 range.  At that time he had resolving viral related symptoms that consisted of malaise/fatigue and joint pains.  That is all since resolved.  Feels generally well otherwise.

## 2024-09-05 LAB
BASOPHILS # BLD AUTO: 0.05 K/UL
BASOPHILS NFR BLD AUTO: 1.2 %
EOSINOPHIL # BLD AUTO: 0.3 K/UL
EOSINOPHIL NFR BLD AUTO: 7 %
FERRITIN SERPL-MCNC: 173 NG/ML
HCT VFR BLD CALC: 41.6 %
HGB BLD-MCNC: 12.9 G/DL
IMM GRANULOCYTES NFR BLD AUTO: 0.2 %
IRON SATN MFR SERPL: 51 %
IRON SERPL-MCNC: 116 UG/DL
LYMPHOCYTES # BLD AUTO: 1.21 K/UL
LYMPHOCYTES NFR BLD AUTO: 28.4 %
MAN DIFF?: NORMAL
MCHC RBC-ENTMCNC: 31 GM/DL
MCHC RBC-ENTMCNC: 31 PG
MCV RBC AUTO: 100 FL
MONOCYTES # BLD AUTO: 0.38 K/UL
MONOCYTES NFR BLD AUTO: 8.9 %
NEUTROPHILS # BLD AUTO: 2.31 K/UL
NEUTROPHILS NFR BLD AUTO: 54.3 %
PLATELET # BLD AUTO: 206 K/UL
RBC # BLD: 4.16 M/UL
RBC # FLD: 15 %
TIBC SERPL-MCNC: 228 UG/DL
UIBC SERPL-MCNC: 112 UG/DL
WBC # FLD AUTO: 4.26 K/UL

## 2024-10-14 ENCOUNTER — APPOINTMENT (OUTPATIENT)
Dept: GASTROENTEROLOGY | Facility: CLINIC | Age: 79
End: 2024-10-14
Payer: MEDICARE

## 2024-10-14 VITALS
HEIGHT: 66 IN | BODY MASS INDEX: 22.5 KG/M2 | SYSTOLIC BLOOD PRESSURE: 138 MMHG | DIASTOLIC BLOOD PRESSURE: 70 MMHG | WEIGHT: 140 LBS

## 2024-10-14 DIAGNOSIS — Z12.11 ENCOUNTER FOR SCREENING FOR MALIGNANT NEOPLASM OF COLON: ICD-10-CM

## 2024-10-14 PROCEDURE — 99203 OFFICE O/P NEW LOW 30 MIN: CPT

## 2024-11-04 RX ORDER — SODIUM SULFATE, POTASSIUM SULFATE AND MAGNESIUM SULFATE 1.6; 3.13; 17.5 G/177ML; G/177ML; G/177ML
17.5-3.13-1.6 SOLUTION ORAL
Qty: 2 | Refills: 0 | Status: ACTIVE | COMMUNITY
Start: 2024-11-04 | End: 1900-01-01

## 2024-11-15 ENCOUNTER — APPOINTMENT (OUTPATIENT)
Dept: GASTROENTEROLOGY | Facility: AMBULATORY MEDICAL SERVICES | Age: 79
End: 2024-11-15
Payer: MEDICARE

## 2024-11-15 PROCEDURE — 45378 DIAGNOSTIC COLONOSCOPY: CPT

## 2025-02-24 ENCOUNTER — APPOINTMENT (OUTPATIENT)
Dept: INTERNAL MEDICINE | Facility: CLINIC | Age: 80
End: 2025-02-24
Payer: MEDICARE

## 2025-02-24 VITALS — SYSTOLIC BLOOD PRESSURE: 142 MMHG | HEIGHT: 66 IN | DIASTOLIC BLOOD PRESSURE: 94 MMHG

## 2025-02-24 DIAGNOSIS — J32.9 CHRONIC SINUSITIS, UNSPECIFIED: ICD-10-CM

## 2025-02-24 PROCEDURE — G2211 COMPLEX E/M VISIT ADD ON: CPT

## 2025-02-24 PROCEDURE — 99213 OFFICE O/P EST LOW 20 MIN: CPT

## 2025-08-26 ENCOUNTER — RX RENEWAL (OUTPATIENT)
Age: 80
End: 2025-08-26